# Patient Record
Sex: MALE | Race: WHITE
[De-identification: names, ages, dates, MRNs, and addresses within clinical notes are randomized per-mention and may not be internally consistent; named-entity substitution may affect disease eponyms.]

---

## 2017-08-30 ENCOUNTER — HOSPITAL ENCOUNTER (OUTPATIENT)
Dept: HOSPITAL 39 - CT | Age: 62
End: 2017-08-30
Attending: INTERNAL MEDICINE
Payer: COMMERCIAL

## 2017-08-30 DIAGNOSIS — D75.1: ICD-10-CM

## 2017-08-30 DIAGNOSIS — D68.51: Primary | ICD-10-CM

## 2017-08-30 NOTE — CT
EXAM DESCRIPTION: 



Abdomen/Pelvis w/Contrast



CLINICAL HISTORY: 



HEPATOSPLENOMEGALY



COMPARISON: 



None Available



TECHNIQUE: 



CT of the abdomen and Pelvis was performed with IV contrast. This

exam was performed according to our departmental

dose-optimization program, which includes automated exposure

control, adjustment of the mA and/or kV according to patient size

and/or use of iterative reconstruction technique.



FINDINGS: 



The lung bases are unremarkable. There is no pneumoperitoneum,

ascites or adenopathy. No abdominal aortic aneurysm. There is no

evidence of hepatosplenomegaly. The spleen measures 11 or 12 cm

in length. No focal hepatic or splenic lesion. No calcified

gallstone or biliary duct dilation. There are a few tiny

nonobstructing right renal calculi. The kidneys and adrenals are

otherwise unremarkable. No dilated small bowel loops, small bowel

wall thickening or mesenteric inflammation. The prostate is

slightly enlarged resulting in mild mass effect on the bladder

floor. No bladder wall thickening. No colonic wall thickening or

pericolonic inflammation. No concerning bone lesion. There are

degenerative changes in the lumbar spine at multiple levels

including degenerative disc disease, worse at L2-3 and L3-4, with

slight convex rightward scoliosis. Incidentally noted is a tiny

lipoma in the left lateral abdominal wall muscles, doubtful

clinical significance..



IMPRESSION: 



No evidence of hepatosplenomegaly.



Slightly enlarged prostate, please correlate with serum PSA

evaluation.



Degenerative changes in the lumbar spine including degenerative

disc disease at L2-L3 4.



Several small nonobstructing right renal calculi.



Electronically signed by:  Fletcher Miller MD  8/30/2017 10:23 AM CDT

Workstation: [id number]

## 2017-09-18 ENCOUNTER — HOSPITAL ENCOUNTER (OUTPATIENT)
Dept: HOSPITAL 39 - RAD | Age: 62
Discharge: HOME | End: 2017-09-18
Attending: FAMILY MEDICINE
Payer: SELF-PAY

## 2017-09-18 DIAGNOSIS — S69.81XA: Primary | ICD-10-CM

## 2017-09-18 NOTE — RAD
EXAM DESCRIPTION:

Fingers,Right



CLINICAL HISTORY:

61 years Male, TRAUMA



COMPARISON:

None.



FINDINGS:

3 views of the right third (middle) finger show distal soft

tissue injury with a tiny nondisplaced terminal tuft fracture. No

radial opaque foreign body or soft tissue gas.



IMPRESSION:

Tiny nondisplaced terminal tuft fracture with overlying soft

tissue injury suggesting the possibility of open fracture.



Electronically signed by:  Fletcher Miller MD  9/18/2017 10:28 AM CDT

Workstation: [id number]

## 2017-11-03 ENCOUNTER — HOSPITAL ENCOUNTER (OUTPATIENT)
Dept: HOSPITAL 39 - LAB.O | Age: 62
Discharge: HOME | End: 2017-11-03
Attending: INTERNAL MEDICINE
Payer: COMMERCIAL

## 2017-11-03 DIAGNOSIS — D68.51: Primary | ICD-10-CM

## 2017-11-06 ENCOUNTER — HOSPITAL ENCOUNTER (OUTPATIENT)
Dept: HOSPITAL 39 - LAB.O | Age: 62
Discharge: HOME | End: 2017-11-06
Attending: INTERNAL MEDICINE
Payer: COMMERCIAL

## 2017-11-06 DIAGNOSIS — D68.51: Primary | ICD-10-CM

## 2017-11-15 ENCOUNTER — HOSPITAL ENCOUNTER (OUTPATIENT)
Dept: HOSPITAL 39 - LAB.O | Age: 62
Discharge: HOME | End: 2017-11-15
Attending: INTERNAL MEDICINE
Payer: COMMERCIAL

## 2017-11-15 DIAGNOSIS — D68.51: Primary | ICD-10-CM

## 2017-11-20 ENCOUNTER — HOSPITAL ENCOUNTER (OUTPATIENT)
Dept: HOSPITAL 39 - LAB.O | Age: 62
Discharge: HOME | End: 2017-11-20
Attending: INTERNAL MEDICINE
Payer: COMMERCIAL

## 2017-11-20 DIAGNOSIS — D68.51: Primary | ICD-10-CM

## 2017-12-06 ENCOUNTER — HOSPITAL ENCOUNTER (OUTPATIENT)
Dept: HOSPITAL 39 - LAB.O | Age: 62
Discharge: HOME | End: 2017-12-06
Attending: INTERNAL MEDICINE
Payer: COMMERCIAL

## 2017-12-06 DIAGNOSIS — D68.51: Primary | ICD-10-CM

## 2018-01-04 ENCOUNTER — HOSPITAL ENCOUNTER (OUTPATIENT)
Dept: HOSPITAL 39 - LAB.O | Age: 63
Discharge: HOME | End: 2018-01-04
Attending: INTERNAL MEDICINE
Payer: COMMERCIAL

## 2018-01-04 DIAGNOSIS — D68.51: Primary | ICD-10-CM

## 2018-02-06 ENCOUNTER — HOSPITAL ENCOUNTER (OUTPATIENT)
Dept: HOSPITAL 39 - LAB.O | Age: 63
End: 2018-02-06
Attending: INTERNAL MEDICINE
Payer: COMMERCIAL

## 2018-02-06 DIAGNOSIS — D68.51: Primary | ICD-10-CM

## 2018-02-21 ENCOUNTER — HOSPITAL ENCOUNTER (OUTPATIENT)
Dept: HOSPITAL 39 - LAB.O | Age: 63
End: 2018-02-21
Attending: FAMILY MEDICINE
Payer: COMMERCIAL

## 2018-02-21 DIAGNOSIS — R73.9: Primary | ICD-10-CM

## 2018-06-26 ENCOUNTER — HOSPITAL ENCOUNTER (OUTPATIENT)
Dept: HOSPITAL 39 - LAB.O | Age: 63
End: 2018-06-26
Attending: INTERNAL MEDICINE
Payer: COMMERCIAL

## 2018-06-26 DIAGNOSIS — C75.1: Primary | ICD-10-CM

## 2018-06-28 ENCOUNTER — HOSPITAL ENCOUNTER (OUTPATIENT)
Dept: HOSPITAL 39 - MRI | Age: 63
End: 2018-06-28
Payer: COMMERCIAL

## 2018-06-28 DIAGNOSIS — G43.711: Primary | ICD-10-CM

## 2018-06-28 DIAGNOSIS — H53.40: ICD-10-CM

## 2018-06-28 NOTE — MRI
EXAM DESCRIPTION: 



MRI brain without and with contrast



CLINICAL HISTORY: 



Chronic migraine headaches 



COMPARISON: 



Intracranial MRA same day



TECHNIQUE: 



Multi planar, multi sequence MRI evaluation of the brain, pre and

post intravenous gadolinium



FINDINGS: 



No intracranial hemorrhage, infarction, or mass lesion. Normal

gray-white matter differentiation. No restricted diffusion. No

pathologic enhancement throughout the brain



FLAIR images best demonstrate multifocal punctate

hyperintensities in the subcortical, deep and periventricular

white matter of the bilateral cerebral hemispheres. No white

matter abnormality of the brainstem or posterior fossa.

Nonspecific likely manifestation of chronic microvascular

ischemia. White matter hyperintensities can be seen in patients

with migraine headaches



Ventricles are mildly prominent. Cavum septum pellucidum et

vergae. No dilation of temporal horns, third and fourth

ventricles



Normal flow voids are present in the major intracranial arteries

and dural venous sinuses



No abnormality is seen along the course of the cranial nerves.

Normal appearance of the temporal bones



Orbits are grossly normal



Normal aeration of paranasal sinuses and mastoid air cells





IMPRESSION: 



Mild white matter disease, nonspecific likely chronic

microvascular ischemia.



No acute abnormality



Electronically signed by:  Gregorio Dennis MD  6/28/2018 1:05 PM

CDT Workstation: [id number]

## 2018-06-28 NOTE — MRI
EXAM DESCRIPTION: 



MRA Head and/or Neck



CLINICAL HISTORY: 



Chronic migraines with aura. Headaches



COMPARISON: 



MRI brain same day



TECHNIQUE: 



[3D time-of-flight intracranial MR angiography. 3D MIPS images

were generated at a computer workstation and submitted for

interpretation]



FINDINGS: 



[No aneurysm, stenosis, or vascular malformation.]

Carotid dominant supply to the right posterior cerebral artery.

Dominant left vertebral artery supply to the basilar artery.



IMPRESSION: 



[Normal intracranial MR angiogram]





Electronically signed by:  Gregorio Dennis MD  6/28/2018 1:07 PM

CDT Workstation: [id number]

## 2019-08-27 ENCOUNTER — HOSPITAL ENCOUNTER (OUTPATIENT)
Dept: HOSPITAL 39 - LAB.O | Age: 64
End: 2019-08-27
Attending: PSYCHIATRY & NEUROLOGY
Payer: COMMERCIAL

## 2019-08-27 DIAGNOSIS — E03.9: Primary | ICD-10-CM

## 2019-09-01 ENCOUNTER — HOSPITAL ENCOUNTER (OUTPATIENT)
Dept: HOSPITAL 39 - SL | Age: 64
End: 2019-09-01
Attending: PSYCHIATRY & NEUROLOGY
Payer: COMMERCIAL

## 2019-09-01 DIAGNOSIS — G47.30: Primary | ICD-10-CM

## 2019-10-13 ENCOUNTER — HOSPITAL ENCOUNTER (OUTPATIENT)
Dept: HOSPITAL 39 - SL | Age: 64
End: 2019-10-13
Attending: PSYCHIATRY & NEUROLOGY
Payer: COMMERCIAL

## 2019-10-13 DIAGNOSIS — G47.30: Primary | ICD-10-CM

## 2019-11-01 ENCOUNTER — HOSPITAL ENCOUNTER (OUTPATIENT)
Dept: HOSPITAL 39 - MRI | Age: 64
End: 2019-11-01
Attending: PSYCHIATRY & NEUROLOGY
Payer: COMMERCIAL

## 2019-11-01 DIAGNOSIS — R41.3: ICD-10-CM

## 2019-11-01 DIAGNOSIS — R90.82: ICD-10-CM

## 2019-11-01 DIAGNOSIS — G43.009: Primary | ICD-10-CM

## 2019-11-01 NOTE — MRI
EXAM DESCRIPTION: 



MRI brain without contrast



CLINICAL HISTORY: 



MIGRAINE, MEMORY LOSS 



COMPARISON: 



6/28/2018



TECHNIQUE: 



Multi planar, multi sequence MRI evaluation of the brain



FINDINGS: 



No intracranial hemorrhage, infarction, or mass lesion. Normal

gray-white matter differentiation. No restricted diffusion



Mild white matter disease with multifocal punctate T2/flair

hyperintensities in the subcortical, deep and to lesser extent

periventricular white matter of the bilateral cerebral

hemispheres nonspecific, consistent with chronic microvascular

ischemia. Similar appearance compared to previous study.



Cancer septum pellucidum et vergae. Similar mildly prominent

ventricular system



Normal flow voids are present in the major intracranial arteries

and dural venous sinuses



No abnormality is seen along the course of the cranial nerves.

Normal appearance of the temporal bones



Orbits are grossly normal



Mild mucosal thickening. No fluid in the paranasal sinuses and

mastoid air cells





IMPRESSION: 



Mild white matter disease consistent with chronic microvascular

ischemia. This can be seen in patients with migraine headaches.

Similar appearance on previous study



No acute abnormality of the brain



Electronically signed by:  Gregorio Dennis MD  11/1/2019 11:16

AM CDT Workstation: [id number]

## 2020-05-20 ENCOUNTER — HOSPITAL ENCOUNTER (OUTPATIENT)
Dept: HOSPITAL 39 - LAB.O | Age: 65
End: 2020-05-20
Attending: PSYCHIATRY & NEUROLOGY
Payer: COMMERCIAL

## 2020-05-20 DIAGNOSIS — R41.3: Primary | ICD-10-CM

## 2020-05-20 DIAGNOSIS — R25.1: ICD-10-CM

## 2020-08-07 ENCOUNTER — HOSPITAL ENCOUNTER (OUTPATIENT)
Dept: HOSPITAL 39 - RAD | Age: 65
End: 2020-08-07
Attending: FAMILY MEDICINE
Payer: COMMERCIAL

## 2020-08-07 DIAGNOSIS — M19.042: ICD-10-CM

## 2020-08-07 DIAGNOSIS — M19.041: ICD-10-CM

## 2020-08-07 DIAGNOSIS — M19.031: Primary | ICD-10-CM

## 2020-08-07 DIAGNOSIS — M19.032: ICD-10-CM

## 2020-08-09 NOTE — RAD
EXAM DESCRIPTION:

Hand,Left 2 Views



CLINICAL HISTORY:

64 years Male, PAIN IN LEFT HAND



COMPARISON:

None.



FINDINGS:

3 views of the left hand show no acute fracture or malalignment.

Mild joint space narrowing involving several interphalangeal

joints, slightly worse at the fifth DIP joint. No lytic bone

lesion. No bony demineralization. No soft tissue swelling.



IMPRESSION:

Mild polyarticular degenerative changes, slightly worse at the

fifth DIP joint.



Electronically signed by:  Fletcher Miller MD  8/9/2020 1:10 PM CDT

Workstation: [id number]

## 2020-08-09 NOTE — RAD
EXAM DESCRIPTION:

Wrist,Left 3 Views



CLINICAL HISTORY:

64 years Male, PAIN IN UNSPEC JOINT WRIST



COMPARISON:

None.



FINDINGS:

3 left wrist show no acute fracture or malalignment. Joint space

narrowing and osteophyte formation at the first CMC joint. No

additional joint space narrowing. No lytic bone lesion. Vascular

calcifications.



IMPRESSION:

Mild to moderate degenerative changes at the first CMC joint.



Electronically signed by:  Fletcher Miller MD  8/9/2020 1:08 PM CDT

Workstation: [id number]

## 2020-08-09 NOTE — RAD
EXAM DESCRIPTION:

Hand,Right 2 Views



CLINICAL HISTORY:

64 years Male, PAIN IN RIGHT HAND



COMPARISON:

None.



FINDINGS:

3 views of the right hand show no acute fracture or malalignment.

Mild-to-moderate joint space narrowing involving several

interphalangeal joints, worse at the fifth the IP joint. Mild

osteophytosis at the interphalangeal joint of the thumb. No lytic

bone lesion. The bones are not demineralized, no soft tissue

swelling. Vascular calcifications.



IMPRESSION:

Mild polyarticular degenerative changes, worse at the fifth DIP

joint and interphalangeal joint of the thumb.



Electronically signed by:  Fletcher Miller MD  8/9/2020 1:07 PM CDT

Workstation: [id number]

## 2020-08-09 NOTE — RAD
EXAM DESCRIPTION:

Wrist,Right 3 Views



CLINICAL HISTORY:

64 years Male, PAIN IN RIGHT WRIST



COMPARISON:

None.



FINDINGS:

3 views of the right wrist show no acute fracture or

malalignment. Slight joint space narrowing at the first CMC and

triscaphe joints. Minimal radiocarpal joint space narrowing. No

significant productive change, no lytic bone lesion. Vascular

calcifications.



IMPRESSION:

Mild degenerative changes laterally in the right wrist.



Electronically signed by:  Fletcher Miller MD  8/9/2020 1:11 PM CDT

Workstation: [id number]

## 2020-08-12 NOTE — RAD
EXAM DESCRIPTION:

Wrist,Left 3 Views



CLINICAL HISTORY:

64 years Male, PAIN IN UNSPEC JOINT WRIST



COMPARISON:

None.



FINDINGS:

3 left wrist show no acute fracture or malalignment. Joint space

narrowing and osteophyte formation at the first CMC joint. No

additional joint space narrowing. No lytic bone lesion. Vascular

calcifications.



IMPRESSION:

Mild to moderate degenerative changes at the first CMC joint.



Electronically signed by:  Fletcher Miller MD  8/9/2020 1:08 PM CDT

Workstation: [id number]

## 2020-08-12 NOTE — RAD
EXAM DESCRIPTION:

Hand,Left 2 Views



CLINICAL HISTORY:

64 years Male, PAIN IN LEFT HAND



COMPARISON:

None.



FINDINGS:

3 views of the left hand show no acute fracture or malalignment.

Mild joint space narrowing involving several interphalangeal

joints, slightly worse at the fifth DIP joint. No lytic bone

lesion. No bony demineralization. No soft tissue swelling.



IMPRESSION:

Mild polyarticular degenerative changes, slightly worse at the

fifth DIP joint.



Electronically signed by:  Fletcher Miller MD  8/9/2020 1:10 PM CDT

Workstation: [id number]

## 2020-08-12 NOTE — RAD
EXAM DESCRIPTION:

Wrist,Right 3 Views



CLINICAL HISTORY:

64 years Male, PAIN IN RIGHT WRIST



COMPARISON:

None.



FINDINGS:

3 views of the right wrist show no acute fracture or

malalignment. Slight joint space narrowing at the first CMC and

triscaphe joints. Minimal radiocarpal joint space narrowing. No

significant productive change, no lytic bone lesion. Vascular

calcifications.



IMPRESSION:

Mild degenerative changes laterally in the right wrist.



Electronically signed by:  Fletcher Miller MD  8/9/2020 1:11 PM CDT

Workstation: [id number]

## 2020-08-12 NOTE — RAD
EXAM DESCRIPTION:

Hand,Right 2 Views



CLINICAL HISTORY:

64 years Male, PAIN IN RIGHT HAND



COMPARISON:

None.



FINDINGS:

3 views of the right hand show no acute fracture or malalignment.

Mild-to-moderate joint space narrowing involving several

interphalangeal joints, worse at the fifth the IP joint. Mild

osteophytosis at the interphalangeal joint of the thumb. No lytic

bone lesion. The bones are not demineralized, no soft tissue

swelling. Vascular calcifications.



IMPRESSION:

Mild polyarticular degenerative changes, worse at the fifth DIP

joint and interphalangeal joint of the thumb.



Electronically signed by:  Fletcher Miller MD  8/9/2020 1:07 PM CDT

Workstation: [id number]

## 2020-11-09 ENCOUNTER — HOSPITAL ENCOUNTER (OUTPATIENT)
Dept: HOSPITAL 39 - GMAM | Age: 65
End: 2020-11-09
Attending: FAMILY MEDICINE
Payer: COMMERCIAL

## 2020-11-09 DIAGNOSIS — Z00.00: Primary | ICD-10-CM
